# Patient Record
Sex: FEMALE | Race: OTHER | URBAN - NONMETROPOLITAN AREA
[De-identification: names, ages, dates, MRNs, and addresses within clinical notes are randomized per-mention and may not be internally consistent; named-entity substitution may affect disease eponyms.]

---

## 2018-04-30 ENCOUNTER — APPOINTMENT (RX ONLY)
Dept: URBAN - NONMETROPOLITAN AREA CLINIC 22 | Facility: CLINIC | Age: 63
Setting detail: DERMATOLOGY
End: 2018-04-30

## 2018-04-30 VITALS — HEIGHT: 65 IN | WEIGHT: 130 LBS

## 2018-04-30 DIAGNOSIS — L57.8 OTHER SKIN CHANGES DUE TO CHRONIC EXPOSURE TO NONIONIZING RADIATION: ICD-10-CM

## 2018-04-30 DIAGNOSIS — B00.1 HERPESVIRAL VESICULAR DERMATITIS: ICD-10-CM | Status: WELL CONTROLLED

## 2018-04-30 DIAGNOSIS — Z71.89 OTHER SPECIFIED COUNSELING: ICD-10-CM

## 2018-04-30 DIAGNOSIS — I83.9 ASYMPTOMATIC VARICOSE VEINS OF LOWER EXTREMITIES: ICD-10-CM | Status: UNCHANGED

## 2018-04-30 DIAGNOSIS — D22 MELANOCYTIC NEVI: ICD-10-CM

## 2018-04-30 PROBLEM — I83.93 ASYMPTOMATIC VARICOSE VEINS OF BILATERAL LOWER EXTREMITIES: Status: ACTIVE | Noted: 2018-04-30

## 2018-04-30 PROBLEM — D22.5 MELANOCYTIC NEVI OF TRUNK: Status: ACTIVE | Noted: 2018-04-30

## 2018-04-30 PROCEDURE — ? EDUCATIONAL RESOURCES PROVIDED

## 2018-04-30 PROCEDURE — ? TREATMENT REGIMEN

## 2018-04-30 PROCEDURE — ? COUNSELING

## 2018-04-30 PROCEDURE — 99213 OFFICE O/P EST LOW 20 MIN: CPT

## 2018-04-30 PROCEDURE — ? PRESCRIPTION

## 2018-04-30 RX ORDER — VALACYCLOVIR HYDROCHLORIDE 1 G/1
TABLET, FILM COATED ORAL
Qty: 32 | Refills: 0 | Status: ERX

## 2018-04-30 ASSESSMENT — LOCATION DETAILED DESCRIPTION DERM
LOCATION DETAILED: LEFT INFERIOR VERMILION LIP
LOCATION DETAILED: LEFT MEDIAL SUPERIOR CHEST
LOCATION DETAILED: LEFT PROXIMAL PRETIBIAL REGION
LOCATION DETAILED: LEFT SUPERIOR VERMILION LIP
LOCATION DETAILED: RIGHT PROXIMAL PRETIBIAL REGION

## 2018-04-30 ASSESSMENT — LOCATION SIMPLE DESCRIPTION DERM
LOCATION SIMPLE: RIGHT PRETIBIAL REGION
LOCATION SIMPLE: LEFT PRETIBIAL REGION
LOCATION SIMPLE: CHEST
LOCATION SIMPLE: LEFT LIP

## 2018-04-30 ASSESSMENT — LOCATION ZONE DERM
LOCATION ZONE: LIP
LOCATION ZONE: LEG
LOCATION ZONE: TRUNK

## 2018-04-30 NOTE — PROCEDURE: TREATMENT REGIMEN
Detail Level: Simple
Plan: Recommend pt wait until the fall and then she can return for sclerotherapy. Quoted pt $400.
Plan: Take Valtrex two pills (2 GM) at onset of outbreak then 2 more pills 12 hours later per episode

## 2019-06-20 ENCOUNTER — APPOINTMENT (RX ONLY)
Dept: URBAN - NONMETROPOLITAN AREA CLINIC 22 | Facility: CLINIC | Age: 64
Setting detail: DERMATOLOGY
End: 2019-06-20

## 2019-06-20 VITALS — HEIGHT: 66 IN | WEIGHT: 130 LBS

## 2019-06-20 DIAGNOSIS — B00.1 HERPESVIRAL VESICULAR DERMATITIS: ICD-10-CM | Status: STABLE

## 2019-06-20 DIAGNOSIS — D18.0 HEMANGIOMA: ICD-10-CM

## 2019-06-20 DIAGNOSIS — D22 MELANOCYTIC NEVI: ICD-10-CM

## 2019-06-20 DIAGNOSIS — L82.1 OTHER SEBORRHEIC KERATOSIS: ICD-10-CM

## 2019-06-20 DIAGNOSIS — Z71.89 OTHER SPECIFIED COUNSELING: ICD-10-CM

## 2019-06-20 DIAGNOSIS — L57.8 OTHER SKIN CHANGES DUE TO CHRONIC EXPOSURE TO NONIONIZING RADIATION: ICD-10-CM

## 2019-06-20 DIAGNOSIS — Z41.9 ENCOUNTER FOR PROCEDURE FOR PURPOSES OTHER THAN REMEDYING HEALTH STATE, UNSPECIFIED: ICD-10-CM

## 2019-06-20 PROBLEM — D22.5 MELANOCYTIC NEVI OF TRUNK: Status: ACTIVE | Noted: 2019-06-20

## 2019-06-20 PROBLEM — D23.39 OTHER BENIGN NEOPLASM OF SKIN OF OTHER PARTS OF FACE: Status: ACTIVE | Noted: 2019-06-20

## 2019-06-20 PROBLEM — D23.71 OTHER BENIGN NEOPLASM OF SKIN OF RIGHT LOWER LIMB, INCLUDING HIP: Status: ACTIVE | Noted: 2019-06-20

## 2019-06-20 PROBLEM — M12.9 ARTHROPATHY, UNSPECIFIED: Status: ACTIVE | Noted: 2019-06-20

## 2019-06-20 PROBLEM — D18.01 HEMANGIOMA OF SKIN AND SUBCUTANEOUS TISSUE: Status: ACTIVE | Noted: 2019-06-20

## 2019-06-20 PROBLEM — D23.72 OTHER BENIGN NEOPLASM OF SKIN OF LEFT LOWER LIMB, INCLUDING HIP: Status: ACTIVE | Noted: 2019-06-20

## 2019-06-20 PROCEDURE — ? TREATMENT REGIMEN

## 2019-06-20 PROCEDURE — 99213 OFFICE O/P EST LOW 20 MIN: CPT

## 2019-06-20 PROCEDURE — ? PRESCRIPTION

## 2019-06-20 PROCEDURE — ? INVENTORY

## 2019-06-20 PROCEDURE — ? COUNSELING

## 2019-06-20 RX ORDER — VALACYCLOVIR 1 G/1
TABLET ORAL
Qty: 30 | Refills: 0 | Status: ERX | COMMUNITY
Start: 2019-06-20

## 2019-06-20 RX ADMIN — VALACYCLOVIR: 1 TABLET ORAL at 17:05

## 2019-06-20 ASSESSMENT — LOCATION DETAILED DESCRIPTION DERM
LOCATION DETAILED: RIGHT MEDIAL SUPERIOR CHEST
LOCATION DETAILED: LEFT SUPERIOR MEDIAL UPPER BACK
LOCATION DETAILED: RIGHT LATERAL SUPERIOR CHEST
LOCATION DETAILED: STERNAL NOTCH
LOCATION DETAILED: RIGHT SUPERIOR LATERAL UPPER BACK

## 2019-06-20 ASSESSMENT — LOCATION SIMPLE DESCRIPTION DERM
LOCATION SIMPLE: LEFT UPPER BACK
LOCATION SIMPLE: RIGHT BACK
LOCATION SIMPLE: CHEST

## 2019-06-20 ASSESSMENT — LOCATION ZONE DERM: LOCATION ZONE: TRUNK

## 2019-06-20 NOTE — PROCEDURE: MIPS QUALITY
Quality 130: Documentation Of Current Medications In The Medical Record: Current Medications Documented
Detail Level: Simple
Quality 226: Preventive Care And Screening: Tobacco Use: Screening And Cessation Intervention: Patient screened for tobacco use and is an ex/non-smoker
Quality 431: Preventive Care And Screening: Unhealthy Alcohol Use - Screening: Patient screened for unhealthy alcohol use using a single question and scores less than 2 times per year

## 2019-08-13 ENCOUNTER — HOSPITAL ENCOUNTER (INPATIENT)
Dept: HOSPITAL 59 - MEDSURG | Age: 64
LOS: 1 days | Discharge: HOME HEALTH | DRG: 470 | End: 2019-08-14
Attending: ORTHOPAEDIC SURGERY | Admitting: ORTHOPAEDIC SURGERY
Payer: COMMERCIAL

## 2019-08-13 DIAGNOSIS — R01.1: ICD-10-CM

## 2019-08-13 DIAGNOSIS — I34.1: ICD-10-CM

## 2019-08-13 DIAGNOSIS — M16.11: Primary | ICD-10-CM

## 2019-08-13 LAB
ABO GROUP: (no result)
ANTIBODY SCREEN: NEGATIVE
RH TYPE: POSITIVE

## 2019-08-13 PROCEDURE — 85018 HEMOGLOBIN: CPT

## 2019-08-13 PROCEDURE — 86850 RBC ANTIBODY SCREEN: CPT

## 2019-08-13 PROCEDURE — 76942 ECHO GUIDE FOR BIOPSY: CPT

## 2019-08-13 PROCEDURE — 86900 BLOOD TYPING SEROLOGIC ABO: CPT

## 2019-08-13 PROCEDURE — 86901 BLOOD TYPING SEROLOGIC RH(D): CPT

## 2019-08-13 PROCEDURE — 80048 BASIC METABOLIC PNL TOTAL CA: CPT

## 2019-08-13 PROCEDURE — 0SR906A REPLACEMENT OF RIGHT HIP JOINT WITH OXIDIZED ZIRCONIUM ON POLYETHYLENE SYNTHETIC SUBSTITUTE, UNCEMENTED, OPEN APPROACH: ICD-10-PCS | Performed by: ORTHOPAEDIC SURGERY

## 2019-08-13 PROCEDURE — 85014 HEMATOCRIT: CPT

## 2019-08-13 RX ADMIN — DEXTROSE AND SODIUM CHLORIDE SCH MLS/HR: 5; .9 INJECTION, SOLUTION INTRAVENOUS at 23:25

## 2019-08-13 RX ADMIN — CEFAZOLIN SODIUM SCH MLS/HR: 2 SOLUTION INTRAVENOUS at 20:42

## 2019-08-13 RX ADMIN — DEXTROSE AND SODIUM CHLORIDE SCH MLS/HR: 5; .9 INJECTION, SOLUTION INTRAVENOUS at 14:50

## 2019-08-13 RX ADMIN — DOCUSATE SODIUM SCH MG: 100 TABLET, FILM COATED ORAL at 21:55

## 2019-08-13 NOTE — REHAB EVALUATION
Patient Information





- Patient Information


Diagnosis: DJD R hip


Ordered Treatment: PT Evaluate and Treat


Status: Initial Evaluation


Surgery: Yes (R THR)


Date of Surgery: 08/13/19


Past Medical/Surgical Hx: 


                          PAST MEDICAL/SURGICAL HISTORY





Past Surgical History            C SECTIONS X'S 4


                                 BILAT FOOT SX


                                 NASAL SX


                                 C SCOPES





PMH - Respiratory





Hx Respiratory Disorders         Yes


Hx of SOB                        Yes: AT TIMES





PMH - Cardiovascular





Hx Cardiovascular Disorders      Yes


Hx Edema                         Yes: MILD AT END OF DAY


Hx Palpitations                  Yes: OCCASSIONALLY


Hx Heart Murmur                  Yes


Hx of Mitral Valve Prolapse      Yes: VERY MILD


Exercise Tolerance               Good


Hx of Migraines                  Yes: 2-3 A MONTH


Comment:                         having echo 8-2-19





PMH - Neuro





Hx Neurological Disorders        Yes





PMH - GI





Hx Gastrointestinal Disorders    No





PMH - 





Hx Genitourinary Disorders       No


Hx Age of Menopause              53





PMH - Endocrine





Hx Endocrine Disorders           No





PMH - Musculoskeletal





Hx Musculoskeletal Disorders     Yes


Hx Arthritis                     Yes: RIGHT HIP, NECK AND LUMBAR





PMH - Psych





Hx Psychiatric Problems          Yes


Hx Anxiety                       Yes: R/T SURGERY





PMH - Hematology/Oncology





Hx Hematology/Oncology           Yes


Disorders                        


Hx Blood Transfusion Reaction    No








Premorbid Status: Detail (The patient was independent with all mobility prior to

surgery.)


Social History: Detail (The patient will be staying with her daughter upon 

discharge. Her daughter's house is one story with 2 steps at the enterance with 

on handrail on the right side when going up. The bathroom is equipped with the 

following: walk in shower with a grab bar, standard height toilet  with no grab 

bars. The patient has a 4 wheeled walker and is going to get a riser seat.)


Precautions: Universal, Fall, Other (THR precautions.)





- Time With Patient


Total Time Spent With Patient (Min): 30


Treatment Procedures: Detail (Initial Evaluation, low complexity)





Subjective Information





- Subjective Information


Per Patient (The patient had complaints of R hip pain level 5-6 using 0-10 pain 

scale.)





Objective Data





- Mental Status


Patient Orientation: Oriented x3





- Visual Perception


Appears within normal limits for therapeutic activities





- ROM


Not within normal limits (The patient's R hip is within total hip precautions. 

All other LE AROM was WNL.)





- Strength/Tone


Not within normal limits (The patient's R LE strength was not tested s/p  

surgery however was functional ie: the patient was able to lift LE's in and out 

of bed. The patient's L LE strength was WFL.)





- Bed Mobility


Independent (The patient is independent with supine to and from sit transfer and

scooting up in bed.)





- Transfers


Independent (The patient is independent with sit to and from stand transfer.)





- Balance


Balance Sitting: Good


Balance Standing: Good





- Sensation


Intact





- Gait


Detail (The patient ambulated with 4 wheeled walker WBAT on the R LE a distance 

of 100 feet with assistance for IV only.)





Patient Education





- Patient Education


Teaching Topic: Precautions (The patient demonstrated good understanding of THR 

precautions.)


Response: Return Demonstration


Teaching Method: Discussion, Demonstration


Teaching Recipient: Patient


Barriers To Learning: None





Problem List





- Problem List


Physical Therapy Problem List: Detail (1)Decreased R LE strength s/p surgery. 2)

Impaired ambulation s/p surgery.)





Goals





- Goals


Physical Therapy Goals: 1) The patient will be independent with THR HEP.  2) The

patient will ambulate on stairs using proper technique with supervision for 

safety only.





Prognosis





- Prognosis


Good





Plan





- Plan


Physical Therapy Plan: PT 1-2 sessions for gait training on stairs and 

instruction in THR HEP.

## 2019-08-14 LAB
ANION GAP SERPL CALC-SCNC: 8 MMOL/L (ref 7–16)
BUN SERPL-MCNC: 12 MG/DL (ref 8–23)
CO2 SERPL-SCNC: 26 MMOL/L (ref 22–29)
CREAT SERPL-MCNC: 0.7 MG/DL (ref 0.5–0.9)
EST GLOMERULAR FILTRATION RATE: > 60 ML/MIN
GLUCOSE SERPL-MCNC: 104 MG/DL (ref 74–109)
HCT VFR BLD CALC: 32.3 % (ref 35–47)
HGB BLD-MCNC: 10.8 GM/DL (ref 11.6–16)

## 2019-08-14 RX ADMIN — DOCUSATE SODIUM SCH MG: 100 TABLET, FILM COATED ORAL at 09:30

## 2019-08-14 RX ADMIN — CEFAZOLIN SODIUM SCH MLS/HR: 2 SOLUTION INTRAVENOUS at 03:41

## 2019-08-14 RX ADMIN — DEXTROSE AND SODIUM CHLORIDE SCH: 5; .9 INJECTION, SOLUTION INTRAVENOUS at 05:14

## 2019-08-14 RX ADMIN — CEFAZOLIN SODIUM SCH MLS/HR: 2 SOLUTION INTRAVENOUS at 11:19

## 2019-08-14 NOTE — PHYSICAL THERAPY TX NOTE
Physical Therapy Tx Note





- Treatment Note


Tolerated: Good


Total Time Spent With Patient: 35


Physical Therapy Tx Note: Detail (Patient states 5-6/10 pain in right hip.  

Patient was reclined in bed upon PTA arrival.  Patient transferred supine to sit

independently.  Patient transferred sit to and from stand CGA x1.  Patient 

ambulated 446 feet with four wheeled walker SBA x1.  Patient descended and 

ascended 3 steps with using stairwell railing CGA x1.  Patient transferred sit 

to supine independently.  Patient performed the following exercises supine in 

bed x10 reps each: ankle pumps, hip abduction, heel slides, quad sets, glut 

squeezes, and hamstring sets.  Patient tolerated treatment well.  Patient 

displays good understanding of ambulation, stair climbing, and HEP.  Patient was

left reclined in bed with call light within reach.  Patient discharged from 

inpatient PT at this time as all goals are met.)


Physical Therapy Problem List: Detail (1)Decreased R LE strength s/p surgery. 2)

Impaired ambulation s/p surgery.)


Physical Therapy Goals: 1) The patient will be independent with THR HEP.  2) The

patient will ambulate on stairs using proper technique with supervision for 

safety only.


Prognosis: Good


Physical Therapy Plan: Patient discharged from inpatient PT at this time as all 

goals are met.

## 2019-08-14 NOTE — REHAB EVALUATION
Patient Information





- Patient Information


Diagnosis: DJD R hip


Ordered Treatment: OT Evaluate and Treat


Status: Initial Evaluation


Surgery: Yes (R THR)


Date of Surgery: 08/13/19


Past Medical/Surgical Hx: 


                          PAST MEDICAL/SURGICAL HISTORY





Past Surgical History            C SECTIONS X'S 4


                                 BILAT FOOT SX


                                 NASAL SX


                                 C SCOPES





PMH - Respiratory





Hx Respiratory Disorders         Yes


Hx of SOB                        Yes: AT TIMES





PMH - Cardiovascular





Hx Cardiovascular Disorders      Yes


Hx Edema                         Yes: MILD AT END OF DAY


Hx Palpitations                  Yes: OCCASSIONALLY


Hx Heart Murmur                  Yes


Hx of Mitral Valve Prolapse      Yes: VERY MILD


Exercise Tolerance               Good


Hx of Migraines                  Yes: 2-3 A MONTH


Comment:                         having echo 8-2-19





PMH - Neuro





Hx Neurological Disorders        Yes





PMH - GI





Hx Gastrointestinal Disorders    No





PMH - 





Hx Genitourinary Disorders       No


Hx Age of Menopause              53





PMH - Endocrine





Hx Endocrine Disorders           No





PMH - Musculoskeletal





Hx Musculoskeletal Disorders     Yes


Hx Arthritis                     Yes: RIGHT HIP, NECK AND LUMBAR





PMH - Psych





Hx Psychiatric Problems          Yes


Hx Anxiety                       Yes: R/T SURGERY





PMH - Hematology/Oncology





Hx Hematology/Oncology           Yes


Disorders                        


Hx Blood Transfusion Reaction    No








Premorbid Status: Detail (The patient was independent with all mobility, meal 

prep, laundry and home mgmt prior to surgery.  Her daughter will be assisting 

with IADLs after discharge.)


Social History: Detail (The patient will be staying with her daughter upon 

discharge. Her daughter's house is one story with 2 steps at the entrance with 

one handrail on the right side when going up. The bathroom is equipped with the 

following: walk in shower with a grab bar, standard height toilet with no grab 

bars. The patient has a 4 wheeled walker and is going to get a riser seat.  The 

patient's home is a bilevel and has a walk in shower with seat and grab bar and 

a standard height toilet.)


Precautions: Universal, Fall, Other (THR precautions.)





- Time With Patient


Total Time Spent With Patient (Min): 35


Treatment Procedures: Detail (OT eval low complexity)





Subjective Information





- Subjective Information


Per Patient





Objective Data





- Pain


Pain Present: Yes (4/10)





- Mental Status


Patient Orientation: Oriented x3





- Visual Perception


Appears within normal limits for therapeutic activities





- ROM


Within normal limits (Lauri UE AROM)





- Strength/Tone


Within normal limits (Lauri UE strength WNL)





- Coordination


Appears within normal limits for therapeutic activities





- Bed Mobility


Independent (Ind with supine to sit)





- Transfers


Independent (Ind with sit to stand from EOB)





- Balance


Balance Sitting: Good


Balance Standing: Good





- Sensation


Intact





- Gait


Detail (Pt ambulating in room with 4 wheeled walker.)





- ADL's/IADL's


Detail (Pt educated and able to demonstrate Ind with modified LE dressing 

techniques using reacher.  After instruction pt was Ind with doffing briefs and 

slipper socks and donning underwear, pants and slip on shoes.  Pt reports she 

will not be wearing socks.  She is going to look on [employer] for a reacher. 

Reviewed kitchen and shower safety and modifications, pt able to verbalize u

nderstanding.)





Therapy Assessment





- Therapy Assessment


Detail (Pt is Ind with modified LE dressing techniques using reacher while 

maintaining total hip precautions.)





Problem List





- Problem List


Physical Therapy Problem List: Detail (1)Decreased R LE strength s/p surgery. 2)

Impaired ambulation s/p surgery.)


Occupational Therapy Problem List: Detail (No current IP OT problems 

identified.)





Goals





- Goals


Physical Therapy Goals: 1) The patient will be independent with THR HEP.  2) The

patient will ambulate on stairs using proper technique with supervision for 

safety only.


Occupational Therapy Goals: No current IP OT goals identified.





Prognosis





- Prognosis


Good





Plan





- Plan


Physical Therapy Plan: PT 1-2 sessions for gait training on stairs and 

instruction in THR HEP.


Occupational Therapy Plan: No further IP OT recommended.  Thank you for this 

referral.

## 2019-08-16 NOTE — OPERATIVE NOTE
DATE OF SURGERY: 08/13/2019 



PREOPERATIVE DIAGNOSIS: End-stage arthrosis of the right hip. 



POSTOPERATIVE DIAGNOSIS: End-stage arthrosis of the right hip. 



OPERATION: Cementless right total hip arthroplasty using [employer] 
components with a size 52 no-hole Reflection cup, 32 mm diameter 35-degree 
offset highly crosslinked liner, a size 13 standard cementless Dendron stem with
a +0 32 mm diameter Oxinium head. 



STAFF SURGEON: Quinn Diaz MD 



ANESTHESIA: Spinal. 



PREPARATION: Chloraprep. 



INDIVIDUAL CONSIDERATIONS: None. 



PROCEDURE: The patient was taken to the operating room, placed supine on the 
operating room table. She had a successful induction of spinal anesthetic. She 
was then placed on her side right side up, and her right leg and hip were 
prepped and draped in the usual fashion. 



The patient had direct posterior approach to the hip. Sharp dissection carried 
down through skin and subcutaneous tissues. Small veins were coagulated with a 
Bovie. The tensor gluteal fascia was opened along the entire length of the 
incision, and deep retractors were placed. Short external rotators were 
identified, piriformis fossa removed with a Bovie. This exposed the posterior 
capsule. Posterior capsulectomy was performed. Hip was dislocated posteriorly. 
The patient had exposed bone throughout with large marginal osteophytes. A 
femoral neck cut was made about a fingerbreadth above the lesser troc. A rim 
capsulectomy was performed. Starting with a 44 mm reamer to get just to the 
medial wall, I reamed to the introitus, which was a 51 for a 52 cup. I slightly 
under-reamed to 50. After irrigating it out and getting debris out, I impacted a
size 52 no-hole Reflection cup in 20 degrees of forward flexion and 40 degrees 
of abduction using the extraarticular alignment guide and bony landmarks. There 
was solid cementless fixation. After irrigation, I placed a center cap screw and
then impacted a size 32 mm diameter 35-degree offset liner in 20 degrees with 
the offset basically posterior and inferior. This gave an excellent stable 
acetabular construct, and this was packed off. 



The proximal femur was delivered into the wound, and box cutting osteotome was 
used to remove the proximal metaphyseal bone. Mid stem reaming was done to a 13.
I started feeling cortex at around 12. I broached to a 13 anteversion was the 
natural angle between 25-30 degrees. Calcar reaming and then with a standard 
offset trial and 32 head, there was excellent motion and stability. I removed 
the trial, irrigated it out, and impacted a size 13 standard offset Dendron stem
with solid cementless fixation, solid calcar contact. I dried the Schuyler taper 
and then impacted a 32 mm diameter Oxinium head, irrigated out to get rid of 
debris, reduced the hip. I had solid stability, normal to shuck, just total and 
complete anterior stability in full extension and external rotation. I flexed to
90/90 position, completely stable, flexed her up to knee chest, internally 
rotated 40 degrees, still stable. After irrigation, hemostasis was obtained with
a Bovie. Sciatic nerve was inspected and found to be completely intact. I placed
1 g of tranexamic acid mixed with 30 mL of saline deep to the fascia. The fascia
was then closed with a running #2 quill, subcu was closed in layers with running
0 quill, skin was closed with staples. A sterile bulky compressive GRETCHEN dressing
was applied. Prior to closure, I did again infiltrate the skin with 30 mL of 
0.5% Marcaine with epinephrine. The patient tolerated the procedure well. Needle
and sponge counts were correct. Estimated blood loss was 300 mL. We will check a
hemoglobin in the morning. There were no complications. 

[facility]

## 2019-08-16 NOTE — DISCHARGE SUMMARY
DATE OF ADMISSION: 08/13/2019 



DATE OF DISCHARGE: 08/14/2019 



DATE OF SURGERY: 08/13/2019 



HISTORY: The patient is a delightful 63-year-old female who presents with end-
stage arthrosis of her right hip. She was admitted after right total hip 
arthroplasty. Postoperatively, she did well. Her hospital course was 
unremarkable. Discharge hemoglobin was 12. The plan is to discharge her home in 
the care of her family. Home PT visiting nurse has been arranged. She will be 
given Ultram and Tylenol for pain, Xarelto followed by aspirin for DVT 
prophylaxis. She will follow up in my office in 4 weeks. Sutures will be removed
by the visiting nurse in 2 weeks. 



FINAL DIAGNOSIS: End-stage arthrosis of the right hip. 



OPERATIONS AND PROCEDURES: Cementless right total hip arthroplasty. 



DISCHARGE CONDITION: Good. 

DREW

## 2022-08-01 ENCOUNTER — APPOINTMENT (RX ONLY)
Dept: URBAN - METROPOLITAN AREA CLINIC 251 | Facility: CLINIC | Age: 67
Setting detail: DERMATOLOGY
End: 2022-08-01

## 2022-08-01 DIAGNOSIS — L65.9 NONSCARRING HAIR LOSS, UNSPECIFIED: ICD-10-CM

## 2022-08-01 PROCEDURE — ? COSMETIC CONSULTATION: HAIR LOSS

## 2022-08-01 PROCEDURE — ? ADDITIONAL NOTES

## 2022-08-01 PROCEDURE — ? COUNSELING

## 2022-08-01 ASSESSMENT — LOCATION ZONE DERM: LOCATION ZONE: SCALP

## 2022-08-01 ASSESSMENT — LOCATION SIMPLE DESCRIPTION DERM: LOCATION SIMPLE: ANTERIOR SCALP

## 2022-08-01 ASSESSMENT — LOCATION DETAILED DESCRIPTION DERM: LOCATION DETAILED: MID-FRONTAL SCALP

## 2022-08-01 NOTE — HPI: HAIR LOSS
How Did The Hair Loss Occur?: gradual in onset
How Severe Is Your Hair Loss?: moderate
Additional History: Tried use of minoxidil in past without improvement, used daily- didn't like greasy feeling.

## 2022-08-01 NOTE — PROCEDURE: ADDITIONAL NOTES
Detail Level: Simple
Render Risk Assessment In Note?: no
Additional Notes: Patient gets Botox for migraines. Will schedule PRP at least 1 week after botox appointment.

## 2022-10-03 ENCOUNTER — APPOINTMENT (RX ONLY)
Dept: URBAN - METROPOLITAN AREA CLINIC 251 | Facility: CLINIC | Age: 67
Setting detail: DERMATOLOGY
End: 2022-10-03

## 2022-10-03 DIAGNOSIS — L65.9 NONSCARRING HAIR LOSS, UNSPECIFIED: ICD-10-CM

## 2022-10-03 PROCEDURE — ? PLATELET RICH PLASMA INJECTION

## 2022-10-03 PROCEDURE — ? ADDITIONAL NOTES

## 2022-10-03 ASSESSMENT — LOCATION ZONE DERM
LOCATION ZONE: FACE
LOCATION ZONE: SCALP

## 2022-10-03 ASSESSMENT — LOCATION DETAILED DESCRIPTION DERM
LOCATION DETAILED: RIGHT INFERIOR LATERAL FOREHEAD
LOCATION DETAILED: RIGHT CENTRAL PARIETAL SCALP
LOCATION DETAILED: LEFT LATERAL FOREHEAD
LOCATION DETAILED: LEFT MEDIAL FRONTAL SCALP
LOCATION DETAILED: LEFT LATERAL FRONTAL SCALP
LOCATION DETAILED: MID-OCCIPITAL SCALP
LOCATION DETAILED: RIGHT CENTRAL FRONTAL SCALP
LOCATION DETAILED: LEFT LATERAL TEMPLE

## 2022-10-03 ASSESSMENT — LOCATION SIMPLE DESCRIPTION DERM
LOCATION SIMPLE: POSTERIOR SCALP
LOCATION SIMPLE: SCALP
LOCATION SIMPLE: LEFT SCALP
LOCATION SIMPLE: LEFT TEMPLE
LOCATION SIMPLE: RIGHT FOREHEAD
LOCATION SIMPLE: LEFT FOREHEAD

## 2022-10-03 NOTE — PROCEDURE: ADDITIONAL NOTES
Additional Notes: Lot number for PRP accessory kit (30ml): [id number]. exp: 1/31/23 Additional Notes: Lot number for PRP accessory kit (30ml): [id number]. exp: 1/31/23

## 2022-10-03 NOTE — PROCEDURE: PLATELET RICH PLASMA INJECTION
Anesthesia Type: 1% lidocaine with epinephrine
Venipuncture Paragraph: An alcohol pad was applied to the venipuncture site. Venipuncture was performed using a butterfly needle. Pressure and a bandaid was applied to the site. No complications were noted.
Who Performed The Venipuncture?: [facility]
Price (Use Numbers Only, No Special Characters Or $): 134.19
Depth In Mm (Will Not Render If 0): 0
Prp Centrifuge Text (Will Not Render If Blank): 1600, 10 min, Brake 0
Amount Injected At This Location In Cc (Will Not Render If 0): 2.5
Amount Injected At This Location In Cc (Will Not Render If 0): 1.5
Which Technique?: Default
Amount Injected At This Location In Cc (Will Not Render If 0): 2
Show Additional Techniques: Yes
Location #5: left parietal scalp
Number Of Tubes Drawn: 1
Location #6: vertex scalp
Location #2: left temporal scalp
Location #7: Mid scalp
Amount Of Blood Collected (Optional - Include Units): 30ml
Consent: Written consent obtained, risks reviewed including but not limited to pain, scarring, infection and incomplete improvement.  Patient understands the procedure is cosmetic in nature and will require out of pocket payment.
Detail Level: Simple
Site Of Venipuncture?: Right AC
Standard Default Technique For Making Prp: [employer] ProGen PRP
Location #1: right temporal scalp
Location #3: frontal scalp
Location #4: right parietal scalp
Post-Care Instructions: After the procedure, take precautions agains sun exposure. Do not apply sunscreen for 12 hours after the procedure. Do not apply make-up for 12 hours after the procedure. Avoid alcohol based toners for 10-14 days. After 2-3 days patients can return to their regular skin regimen.

## 2022-11-14 ENCOUNTER — APPOINTMENT (RX ONLY)
Dept: URBAN - METROPOLITAN AREA CLINIC 251 | Facility: CLINIC | Age: 67
Setting detail: DERMATOLOGY
End: 2022-11-14

## 2022-11-14 DIAGNOSIS — L65.9 NONSCARRING HAIR LOSS, UNSPECIFIED: ICD-10-CM

## 2022-11-14 PROCEDURE — ? ADDITIONAL NOTES

## 2022-11-14 PROCEDURE — ? PLATELET RICH PLASMA INJECTION

## 2022-11-14 ASSESSMENT — LOCATION DETAILED DESCRIPTION DERM
LOCATION DETAILED: RIGHT CENTRAL PARIETAL SCALP
LOCATION DETAILED: LEFT LATERAL FOREHEAD
LOCATION DETAILED: RIGHT INFERIOR LATERAL FOREHEAD
LOCATION DETAILED: LEFT LATERAL TEMPLE
LOCATION DETAILED: MID-OCCIPITAL SCALP
LOCATION DETAILED: LEFT MEDIAL FRONTAL SCALP
LOCATION DETAILED: LEFT LATERAL FRONTAL SCALP
LOCATION DETAILED: RIGHT CENTRAL FRONTAL SCALP

## 2022-11-14 ASSESSMENT — LOCATION SIMPLE DESCRIPTION DERM
LOCATION SIMPLE: LEFT SCALP
LOCATION SIMPLE: LEFT TEMPLE
LOCATION SIMPLE: LEFT FOREHEAD
LOCATION SIMPLE: SCALP
LOCATION SIMPLE: POSTERIOR SCALP
LOCATION SIMPLE: RIGHT FOREHEAD

## 2022-11-14 ASSESSMENT — LOCATION ZONE DERM
LOCATION ZONE: SCALP
LOCATION ZONE: FACE

## 2022-11-14 NOTE — PROCEDURE: ADDITIONAL NOTES
Additional Notes: Lot number for PRP accessory kit (30ml): [id number]. exp: 1/31/23 Additional Notes: Lot number for PRP accessory kit (30ml): [id number]. exp: 1/31/23

## 2022-11-14 NOTE — PROCEDURE: PLATELET RICH PLASMA INJECTION
Anesthesia Type: 1% lidocaine with epinephrine
Venipuncture Paragraph: An alcohol pad was applied to the venipuncture site. Venipuncture was performed using a butterfly needle. Pressure and a bandaid was applied to the site. No complications were noted.
Who Performed The Venipuncture?: [facility]
Price (Use Numbers Only, No Special Characters Or $): 971.53
Depth In Mm (Will Not Render If 0): 0
Prp Centrifuge Text (Will Not Render If Blank): 1600, 10 min, Brake 5
Amount Injected At This Location In Cc (Will Not Render If 0): 3
Amount Injected At This Location In Cc (Will Not Render If 0): 2
Which Technique?: Default
Show Additional Techniques: Yes
Location #5: left parietal scalp
Number Of Tubes Drawn: 1
Location #6: vertex scalp
Location #2: left temporal scalp
Location #7: Mid scalp
Amount Of Blood Collected (Optional - Include Units): 30ml
Anesthesia Volume In Cc: 1.5
Consent: Written consent obtained, risks reviewed including but not limited to pain, scarring, infection and incomplete improvement.  Patient understands the procedure is cosmetic in nature and will require out of pocket payment.
Detail Level: Simple
Site Of Venipuncture?: Left AC
Standard Default Technique For Making Prp: [employer] ProGen PRP
Location #1: right temporal scalp
Location #3: frontal scalp
Location #4: right parietal scalp
Post-Care Instructions: After the procedure, take precautions agains sun exposure. Do not apply sunscreen for 12 hours after the procedure. Do not apply make-up for 12 hours after the procedure. Avoid alcohol based toners for 10-14 days. After 2-3 days patients can return to their regular skin regimen.

## 2022-12-13 ENCOUNTER — APPOINTMENT (RX ONLY)
Dept: URBAN - METROPOLITAN AREA CLINIC 184 | Facility: CLINIC | Age: 67
Setting detail: DERMATOLOGY
End: 2022-12-13

## 2022-12-13 DIAGNOSIS — L65.9 NONSCARRING HAIR LOSS, UNSPECIFIED: ICD-10-CM

## 2022-12-13 PROCEDURE — ? ADDITIONAL NOTES

## 2022-12-13 PROCEDURE — ? PLATELET RICH PLASMA INJECTION

## 2022-12-13 ASSESSMENT — LOCATION SIMPLE DESCRIPTION DERM
LOCATION SIMPLE: POSTERIOR SCALP
LOCATION SIMPLE: LEFT TEMPLE
LOCATION SIMPLE: LEFT SCALP
LOCATION SIMPLE: LEFT FOREHEAD
LOCATION SIMPLE: SCALP
LOCATION SIMPLE: RIGHT FOREHEAD

## 2022-12-13 ASSESSMENT — LOCATION ZONE DERM
LOCATION ZONE: FACE
LOCATION ZONE: SCALP

## 2022-12-13 ASSESSMENT — LOCATION DETAILED DESCRIPTION DERM
LOCATION DETAILED: LEFT MEDIAL FRONTAL SCALP
LOCATION DETAILED: LEFT LATERAL FOREHEAD
LOCATION DETAILED: RIGHT CENTRAL FRONTAL SCALP
LOCATION DETAILED: RIGHT INFERIOR LATERAL FOREHEAD
LOCATION DETAILED: LEFT LATERAL FRONTAL SCALP
LOCATION DETAILED: RIGHT CENTRAL PARIETAL SCALP
LOCATION DETAILED: LEFT LATERAL TEMPLE
LOCATION DETAILED: MID-OCCIPITAL SCALP

## 2022-12-13 NOTE — PROCEDURE: ADDITIONAL NOTES
Additional Notes: Lot number for PRP accessory kit (30ml): [id number] exp: 1/31/2023. Additional Notes: Lot number for PRP accessory kit (30ml): [id number] exp: 1/31/2023.

## 2022-12-13 NOTE — PROCEDURE: PLATELET RICH PLASMA INJECTION
Anesthesia Type: 1% lidocaine with epinephrine
Venipuncture Paragraph: An alcohol pad was applied to the venipuncture site. Venipuncture was performed using a butterfly needle. Pressure and a bandaid was applied to the site. No complications were noted.
Who Performed The Venipuncture?: [facility]
Price (Use Numbers Only, No Special Characters Or $): 780.06
Depth In Mm (Will Not Render If 0): 0
Prp Centrifuge Text (Will Not Render If Blank): 1600, 10 min, Brake 5
Amount Injected At This Location In Cc (Will Not Render If 0): 3
Amount Injected At This Location In Cc (Will Not Render If 0): 2
Which Technique?: Default
Show Additional Techniques: Yes
Location #5: left parietal scalp
Number Of Tubes Drawn: 1
Location #6: vertex scalp
Location #2: left temporal scalp
Location #7: Mid scalp
Amount Of Blood Collected (Optional - Include Units): 30ml
Anesthesia Volume In Cc: 1.5
Consent: Written consent obtained, risks reviewed including but not limited to pain, scarring, infection and incomplete improvement.  Patient understands the procedure is cosmetic in nature and will require out of pocket payment.
Detail Level: Simple
Site Of Venipuncture?: Right AC
Standard Default Technique For Making Prp: [employer] ProGen PRP
Location #1: right temporal scalp
Location #3: frontal scalp
Location #4: right parietal scalp
Post-Care Instructions: After the procedure, take precautions agains sun exposure. Do not apply sunscreen for 12 hours after the procedure. Do not apply make-up for 12 hours after the procedure. Avoid alcohol based toners for 10-14 days. After 2-3 days patients can return to their regular skin regimen.

## 2023-01-24 ENCOUNTER — APPOINTMENT (RX ONLY)
Dept: URBAN - METROPOLITAN AREA CLINIC 184 | Facility: CLINIC | Age: 68
Setting detail: DERMATOLOGY
End: 2023-01-24

## 2023-01-24 DIAGNOSIS — L65.9 NONSCARRING HAIR LOSS, UNSPECIFIED: ICD-10-CM

## 2023-01-24 DIAGNOSIS — Z41.9 ENCOUNTER FOR PROCEDURE FOR PURPOSES OTHER THAN REMEDYING HEALTH STATE, UNSPECIFIED: ICD-10-CM

## 2023-01-24 PROCEDURE — ? ADDITIONAL NOTES

## 2023-01-24 PROCEDURE — ? PLATELET RICH PLASMA INJECTION

## 2023-01-24 PROCEDURE — ? BOTOX

## 2023-01-24 PROCEDURE — ? SCULPTRA

## 2023-01-24 ASSESSMENT — LOCATION DETAILED DESCRIPTION DERM
LOCATION DETAILED: LEFT LATERAL FOREHEAD
LOCATION DETAILED: RIGHT CENTRAL FRONTAL SCALP
LOCATION DETAILED: LEFT LATERAL TEMPLE
LOCATION DETAILED: RIGHT INFERIOR LATERAL FOREHEAD
LOCATION DETAILED: LEFT LATERAL FRONTAL SCALP
LOCATION DETAILED: RIGHT CENTRAL PARIETAL SCALP
LOCATION DETAILED: LEFT MEDIAL FRONTAL SCALP
LOCATION DETAILED: MID-OCCIPITAL SCALP

## 2023-01-24 ASSESSMENT — LOCATION SIMPLE DESCRIPTION DERM
LOCATION SIMPLE: SCALP
LOCATION SIMPLE: POSTERIOR SCALP
LOCATION SIMPLE: RIGHT FOREHEAD
LOCATION SIMPLE: LEFT TEMPLE
LOCATION SIMPLE: LEFT FOREHEAD
LOCATION SIMPLE: LEFT SCALP

## 2023-01-24 ASSESSMENT — LOCATION ZONE DERM
LOCATION ZONE: SCALP
LOCATION ZONE: FACE

## 2023-01-24 NOTE — PROCEDURE: SCULPTRA
Injection Technique: The Sculptra was injected to the listed areas after cleansing the skin and providing appropriate anesthesia. 25g cannula utilized for injection to midface.

## 2023-01-24 NOTE — PROCEDURE: ADDITIONAL NOTES
Render Risk Assessment In Note?: no
Additional Notes: 30% friends and family discount applied; patient is Ninfa’s sister in law.
Detail Level: Simple

## 2023-01-24 NOTE — PROCEDURE: ADDITIONAL NOTES
Additional Notes: Lot number for PRP accessory kit (30ml): [id number] exp: 4/7/2023. Additional Notes: Lot number for PRP accessory kit (30ml): [id number] exp: 4/7/2023.

## 2023-01-24 NOTE — PROCEDURE: SCULPTRA
Dilution Method: The Sculptra was diluted with 7 ml of sterile water and 1 ml of 2% plain lidocaine for a total volume of 8ccs for each vial.

## 2023-01-24 NOTE — PROCEDURE: PLATELET RICH PLASMA INJECTION
Anesthesia Type: 1% lidocaine with epinephrine
Venipuncture Paragraph: An alcohol pad was applied to the venipuncture site. Venipuncture was performed using a butterfly needle. Pressure and a bandaid was applied to the site. No complications were noted.
Who Performed The Venipuncture?: [facility]
Price (Use Numbers Only, No Special Characters Or $): 885.80
Depth In Mm (Will Not Render If 0): 0
Prp Centrifuge Text (Will Not Render If Blank): 1600, 10 min, Brake 5
Amount Injected At This Location In Cc (Will Not Render If 0): 3
Amount Injected At This Location In Cc (Will Not Render If 0): 2
Which Technique?: Default
Show Additional Techniques: Yes
Location #5: left parietal scalp
Number Of Tubes Drawn: 1
Location #6: vertex scalp
Location #2: left temporal scalp
Treatment Number (Optional): 4
Location #7: Mid scalp
Amount Of Blood Collected (Optional - Include Units): 30ml
Anesthesia Volume In Cc: 1.5
Consent: Written consent obtained, risks reviewed including but not limited to pain, scarring, infection and incomplete improvement.  Patient understands the procedure is cosmetic in nature and will require out of pocket payment.
Detail Level: Simple
Site Of Venipuncture?: Left AC
Standard Default Technique For Making Prp: [employer] ProGen PRP
Location #1: right temporal scalp
Location #3: frontal scalp
Location #4: right parietal scalp
Post-Care Instructions: After the procedure, take precautions agains sun exposure. Do not apply sunscreen for 12 hours after the procedure. Do not apply make-up for 12 hours after the procedure. Avoid alcohol based toners for 10-14 days. After 2-3 days patients can return to their regular skin regimen.

## 2023-02-20 ENCOUNTER — APPOINTMENT (RX ONLY)
Dept: URBAN - METROPOLITAN AREA CLINIC 184 | Facility: CLINIC | Age: 68
Setting detail: DERMATOLOGY
End: 2023-02-20

## 2023-02-20 DIAGNOSIS — L65.9 NONSCARRING HAIR LOSS, UNSPECIFIED: ICD-10-CM | Status: IMPROVED

## 2023-02-20 DIAGNOSIS — L57.0 ACTINIC KERATOSIS: ICD-10-CM

## 2023-02-20 PROCEDURE — ? COUNSELING

## 2023-02-20 PROCEDURE — ? MEDICATION COUNSELING

## 2023-02-20 PROCEDURE — 17000 DESTRUCT PREMALG LESION: CPT

## 2023-02-20 PROCEDURE — ? LIQUID NITROGEN

## 2023-02-20 PROCEDURE — 99212 OFFICE O/P EST SF 10 MIN: CPT | Mod: 25

## 2023-02-20 PROCEDURE — ? PRESCRIPTION

## 2023-02-20 PROCEDURE — ? ADDITIONAL NOTES

## 2023-02-20 RX ORDER — BETAMETHASONE DIPROPIONATE 0.5 MG/ML
LOTION TOPICAL
Qty: 60 | Refills: 2 | Status: ERX | COMMUNITY
Start: 2023-02-20

## 2023-02-20 RX ADMIN — BETAMETHASONE DIPROPIONATE: 0.5 LOTION TOPICAL at 00:00

## 2023-02-20 ASSESSMENT — LOCATION SIMPLE DESCRIPTION DERM
LOCATION SIMPLE: POSTERIOR SCALP
LOCATION SIMPLE: POSTERIOR SCALP
LOCATION SIMPLE: ANTERIOR SCALP
LOCATION SIMPLE: ANTERIOR SCALP
LOCATION SIMPLE: RIGHT EAR
LOCATION SIMPLE: SCALP
LOCATION SIMPLE: SCALP

## 2023-02-20 ASSESSMENT — LOCATION DETAILED DESCRIPTION DERM
LOCATION DETAILED: MID-OCCIPITAL SCALP
LOCATION DETAILED: LEFT CENTRAL PARIETAL SCALP
LOCATION DETAILED: MID-FRONTAL SCALP
LOCATION DETAILED: RIGHT CENTRAL FRONTAL SCALP
LOCATION DETAILED: MID-FRONTAL SCALP
LOCATION DETAILED: LEFT CENTRAL PARIETAL SCALP
LOCATION DETAILED: RIGHT SUPERIOR CRUS OF ANTIHELIX
LOCATION DETAILED: MID-OCCIPITAL SCALP
LOCATION DETAILED: RIGHT CENTRAL FRONTAL SCALP

## 2023-02-20 ASSESSMENT — LOCATION ZONE DERM
LOCATION ZONE: SCALP
LOCATION ZONE: SCALP
LOCATION ZONE: EAR

## 2023-02-20 NOTE — PROCEDURE: MEDICATION COUNSELING
Minocycline Counseling: Patient advised regarding possible photosensitivity and discoloration of the teeth, skin, lips, tongue and gums.  Patient instructed to avoid sunlight, if possible.  When exposed to sunlight, patients should wear protective clothing, sunglasses, and sunscreen.  The patient was instructed to call the office immediately if the following severe adverse effects occur:  hearing changes, easy bruising/bleeding, severe headache, or vision changes.  The patient verbalized understanding of the proper use and possible adverse effects of minocycline.  All of the patient's questions and concerns were addressed. ADMIT

## 2023-02-20 NOTE — PROCEDURE: LIQUID NITROGEN
Show Applicator Variable?: Yes
Detail Level: Simple
Post-Care Instructions: I reviewed with the patient in detail post-care instructions. Patient is to wear sunprotection, and avoid picking at any of the treated lesions. Pt may apply Vaseline to crusted or scabbing areas.
Duration Of Freeze Thaw-Cycle (Seconds): 2
Consent: The patient's consent was obtained including but not limited to risks of crusting, scabbing, blistering, scarring, darker or lighter pigmentary change, recurrence, incomplete removal and infection.
Render Note In Bullet Format When Appropriate: No
Number Of Freeze-Thaw Cycles: 3 freeze-thaw cycles

## 2023-02-20 NOTE — PROCEDURE: ADDITIONAL NOTES
Detail Level: Simple
Render Risk Assessment In Note?: no
Additional Notes: Not candidate for oral minoxidil due to cardiac valve insufficiency. Patient in process of finding new cardiologist.\\n\\nAdditional PRP treatment may be done in 3 months.

## 2023-03-21 ENCOUNTER — APPOINTMENT (RX ONLY)
Dept: URBAN - METROPOLITAN AREA CLINIC 184 | Facility: CLINIC | Age: 68
Setting detail: DERMATOLOGY
End: 2023-03-21

## 2023-03-21 DIAGNOSIS — Z41.9 ENCOUNTER FOR PROCEDURE FOR PURPOSES OTHER THAN REMEDYING HEALTH STATE, UNSPECIFIED: ICD-10-CM

## 2023-03-21 PROCEDURE — ? SCULPTRA

## 2023-03-21 PROCEDURE — ? BOTOX

## 2023-03-21 PROCEDURE — ? ADDITIONAL NOTES

## 2023-03-21 NOTE — PROCEDURE: SCULPTRA
Price (Use Numbers Only, No Special Characters Or $): 465 Price (Use Numbers Only, No Special Characters Or $): 889

## 2023-03-21 NOTE — PROCEDURE: ADDITIONAL NOTES
Render Risk Assessment In Note?: no
Detail Level: Simple
Additional Notes: 30% friends [employer] family discount (Merissa’s cousin)

## 2024-01-25 NOTE — PROCEDURE: MEDICATION COUNSELING
